# Patient Record
Sex: MALE | Race: WHITE | URBAN - METROPOLITAN AREA
[De-identification: names, ages, dates, MRNs, and addresses within clinical notes are randomized per-mention and may not be internally consistent; named-entity substitution may affect disease eponyms.]

---

## 2018-10-15 PROCEDURE — 86803 HEPATITIS C AB TEST: CPT | Performed by: INTERNAL MEDICINE

## 2018-10-19 PROCEDURE — 80074 ACUTE HEPATITIS PANEL: CPT | Performed by: INTERNAL MEDICINE

## 2018-10-19 PROCEDURE — 83516 IMMUNOASSAY NONANTIBODY: CPT | Performed by: INTERNAL MEDICINE

## 2018-10-19 PROCEDURE — 82390 ASSAY OF CERULOPLASMIN: CPT | Performed by: INTERNAL MEDICINE

## 2025-06-10 RX ORDER — ROSUVASTATIN CALCIUM 5 MG/1
5 TABLET, COATED ORAL NIGHTLY
COMMUNITY
Start: 2025-04-01

## 2025-06-10 RX ORDER — HEPARIN SODIUM 5000 [USP'U]/ML
5000 INJECTION, SOLUTION INTRAVENOUS; SUBCUTANEOUS ONCE
Status: CANCELLED | OUTPATIENT
Start: 2025-06-10 | End: 2025-06-10

## 2025-06-19 ENCOUNTER — ANESTHESIA EVENT (OUTPATIENT)
Dept: SURGERY | Facility: HOSPITAL | Age: 60
End: 2025-06-19
Payer: COMMERCIAL

## 2025-06-20 ENCOUNTER — APPOINTMENT (OUTPATIENT)
Dept: GENERAL RADIOLOGY | Facility: HOSPITAL | Age: 60
End: 2025-06-20
Attending: UROLOGY
Payer: COMMERCIAL

## 2025-06-20 ENCOUNTER — ANESTHESIA (OUTPATIENT)
Dept: SURGERY | Facility: HOSPITAL | Age: 60
End: 2025-06-20
Payer: COMMERCIAL

## 2025-06-20 ENCOUNTER — HOSPITAL ENCOUNTER (OUTPATIENT)
Facility: HOSPITAL | Age: 60
Discharge: HOME | End: 2025-06-21
Attending: UROLOGY | Admitting: UROLOGY
Payer: COMMERCIAL

## 2025-06-20 PROBLEM — N40.1 BPH LOC W URIN OBS/LUTS: Status: ACTIVE | Noted: 2025-06-20

## 2025-06-20 PROCEDURE — 88305 TISSUE EXAM BY PATHOLOGIST: CPT | Performed by: UROLOGY

## 2025-06-20 RX ORDER — NALOXONE HYDROCHLORIDE 0.4 MG/ML
0.08 INJECTION, SOLUTION INTRAMUSCULAR; INTRAVENOUS; SUBCUTANEOUS AS NEEDED
Status: DISCONTINUED | OUTPATIENT
Start: 2025-06-20 | End: 2025-06-20 | Stop reason: HOSPADM

## 2025-06-20 RX ORDER — ONDANSETRON 2 MG/ML
4 INJECTION INTRAMUSCULAR; INTRAVENOUS EVERY 6 HOURS PRN
Status: DISCONTINUED | OUTPATIENT
Start: 2025-06-20 | End: 2025-06-20 | Stop reason: HOSPADM

## 2025-06-20 RX ORDER — PROCHLORPERAZINE EDISYLATE 5 MG/ML
5 INJECTION INTRAMUSCULAR; INTRAVENOUS EVERY 8 HOURS PRN
Status: DISCONTINUED | OUTPATIENT
Start: 2025-06-20 | End: 2025-06-21

## 2025-06-20 RX ORDER — ALBUTEROL SULFATE 0.83 MG/ML
2.5 SOLUTION RESPIRATORY (INHALATION) AS NEEDED
Status: DISCONTINUED | OUTPATIENT
Start: 2025-06-20 | End: 2025-06-20 | Stop reason: HOSPADM

## 2025-06-20 RX ORDER — HYDROMORPHONE HYDROCHLORIDE 1 MG/ML
0.4 INJECTION, SOLUTION INTRAMUSCULAR; INTRAVENOUS; SUBCUTANEOUS EVERY 2 HOUR PRN
Status: DISCONTINUED | OUTPATIENT
Start: 2025-06-20 | End: 2025-06-21

## 2025-06-20 RX ORDER — ONDANSETRON 2 MG/ML
4 INJECTION INTRAMUSCULAR; INTRAVENOUS EVERY 6 HOURS PRN
Status: DISCONTINUED | OUTPATIENT
Start: 2025-06-20 | End: 2025-06-21

## 2025-06-20 RX ORDER — SODIUM CHLORIDE, SODIUM LACTATE, POTASSIUM CHLORIDE, CALCIUM CHLORIDE 600; 310; 30; 20 MG/100ML; MG/100ML; MG/100ML; MG/100ML
INJECTION, SOLUTION INTRAVENOUS CONTINUOUS
Status: DISCONTINUED | OUTPATIENT
Start: 2025-06-20 | End: 2025-06-20 | Stop reason: HOSPADM

## 2025-06-20 RX ORDER — SCOPOLAMINE 1 MG/3D
1 PATCH, EXTENDED RELEASE TRANSDERMAL ONCE
Status: DISCONTINUED | OUTPATIENT
Start: 2025-06-20 | End: 2025-06-20 | Stop reason: HOSPADM

## 2025-06-20 RX ORDER — ENOXAPARIN SODIUM 100 MG/ML
40 INJECTION SUBCUTANEOUS NIGHTLY
Status: DISCONTINUED | OUTPATIENT
Start: 2025-06-21 | End: 2025-06-21

## 2025-06-20 RX ORDER — PHENAZOPYRIDINE HYDROCHLORIDE 100 MG/1
200 TABLET, FILM COATED ORAL 3 TIMES DAILY PRN
Status: DISCONTINUED | OUTPATIENT
Start: 2025-06-20 | End: 2025-06-21

## 2025-06-20 RX ORDER — ROSUVASTATIN CALCIUM 5 MG/1
5 TABLET, COATED ORAL NIGHTLY
Status: DISCONTINUED | OUTPATIENT
Start: 2025-06-20 | End: 2025-06-21

## 2025-06-20 RX ORDER — ACETAMINOPHEN 500 MG
1000 TABLET ORAL ONCE
Status: DISCONTINUED | OUTPATIENT
Start: 2025-06-20 | End: 2025-06-20 | Stop reason: HOSPADM

## 2025-06-20 RX ORDER — FAMOTIDINE 20 MG/1
20 TABLET, FILM COATED ORAL 2 TIMES DAILY
Status: DISCONTINUED | OUTPATIENT
Start: 2025-06-20 | End: 2025-06-21

## 2025-06-20 RX ORDER — HYDROCODONE BITARTRATE AND ACETAMINOPHEN 5; 325 MG/1; MG/1
1 TABLET ORAL ONCE AS NEEDED
Status: DISCONTINUED | OUTPATIENT
Start: 2025-06-20 | End: 2025-06-20 | Stop reason: HOSPADM

## 2025-06-20 RX ORDER — FLUTICASONE PROPIONATE 50 MCG
2 SPRAY, SUSPENSION (ML) NASAL DAILY
Status: DISCONTINUED | OUTPATIENT
Start: 2025-06-20 | End: 2025-06-21

## 2025-06-20 RX ORDER — LABETALOL HYDROCHLORIDE 5 MG/ML
5 INJECTION, SOLUTION INTRAVENOUS EVERY 5 MIN PRN
Status: DISCONTINUED | OUTPATIENT
Start: 2025-06-20 | End: 2025-06-20 | Stop reason: HOSPADM

## 2025-06-20 RX ORDER — ONDANSETRON 2 MG/ML
INJECTION INTRAMUSCULAR; INTRAVENOUS AS NEEDED
Status: DISCONTINUED | OUTPATIENT
Start: 2025-06-20 | End: 2025-06-20 | Stop reason: SURG

## 2025-06-20 RX ORDER — SENNOSIDES 8.6 MG
17.2 TABLET ORAL NIGHTLY
Status: DISCONTINUED | OUTPATIENT
Start: 2025-06-20 | End: 2025-06-21

## 2025-06-20 RX ORDER — HYDROMORPHONE HYDROCHLORIDE 1 MG/ML
0.6 INJECTION, SOLUTION INTRAMUSCULAR; INTRAVENOUS; SUBCUTANEOUS EVERY 5 MIN PRN
Status: DISCONTINUED | OUTPATIENT
Start: 2025-06-20 | End: 2025-06-20 | Stop reason: HOSPADM

## 2025-06-20 RX ORDER — HYDROMORPHONE HYDROCHLORIDE 1 MG/ML
0.2 INJECTION, SOLUTION INTRAMUSCULAR; INTRAVENOUS; SUBCUTANEOUS EVERY 5 MIN PRN
Status: DISCONTINUED | OUTPATIENT
Start: 2025-06-20 | End: 2025-06-20 | Stop reason: HOSPADM

## 2025-06-20 RX ORDER — TAMSULOSIN HYDROCHLORIDE 0.4 MG/1
0.4 CAPSULE ORAL
COMMUNITY
Start: 2025-01-30

## 2025-06-20 RX ORDER — ACETAMINOPHEN 325 MG/1
650 TABLET ORAL
Status: DISCONTINUED | OUTPATIENT
Start: 2025-06-20 | End: 2025-06-21

## 2025-06-20 RX ORDER — FAMOTIDINE 10 MG/ML
20 INJECTION, SOLUTION INTRAVENOUS 2 TIMES DAILY
Status: DISCONTINUED | OUTPATIENT
Start: 2025-06-20 | End: 2025-06-21

## 2025-06-20 RX ORDER — ACETAMINOPHEN 500 MG
1000 TABLET ORAL ONCE AS NEEDED
Status: DISCONTINUED | OUTPATIENT
Start: 2025-06-20 | End: 2025-06-20 | Stop reason: HOSPADM

## 2025-06-20 RX ORDER — TAMSULOSIN HYDROCHLORIDE 0.4 MG/1
0.4 CAPSULE ORAL
Status: DISCONTINUED | OUTPATIENT
Start: 2025-06-20 | End: 2025-06-21

## 2025-06-20 RX ORDER — SODIUM CHLORIDE, SODIUM LACTATE, POTASSIUM CHLORIDE, CALCIUM CHLORIDE 600; 310; 30; 20 MG/100ML; MG/100ML; MG/100ML; MG/100ML
INJECTION, SOLUTION INTRAVENOUS CONTINUOUS
Status: DISCONTINUED | OUTPATIENT
Start: 2025-06-20 | End: 2025-06-20

## 2025-06-20 RX ORDER — SODIUM CHLORIDE 9 MG/ML
INJECTION, SOLUTION INTRAVENOUS CONTINUOUS
Status: DISCONTINUED | OUTPATIENT
Start: 2025-06-20 | End: 2025-06-21

## 2025-06-20 RX ORDER — HYDROMORPHONE HYDROCHLORIDE 1 MG/ML
0.8 INJECTION, SOLUTION INTRAMUSCULAR; INTRAVENOUS; SUBCUTANEOUS EVERY 2 HOUR PRN
Status: DISCONTINUED | OUTPATIENT
Start: 2025-06-20 | End: 2025-06-21

## 2025-06-20 RX ORDER — DEXAMETHASONE SODIUM PHOSPHATE 4 MG/ML
VIAL (ML) INJECTION AS NEEDED
Status: DISCONTINUED | OUTPATIENT
Start: 2025-06-20 | End: 2025-06-20 | Stop reason: SURG

## 2025-06-20 RX ORDER — MAGNESIUM HYDROXIDE 1200 MG/15ML
3000 LIQUID ORAL CONTINUOUS
Status: DISCONTINUED | OUTPATIENT
Start: 2025-06-20 | End: 2025-06-21

## 2025-06-20 RX ORDER — OXYCODONE HYDROCHLORIDE 5 MG/1
5 TABLET ORAL EVERY 4 HOURS PRN
Status: DISCONTINUED | OUTPATIENT
Start: 2025-06-20 | End: 2025-06-21

## 2025-06-20 RX ORDER — DOCUSATE SODIUM 100 MG/1
100 CAPSULE, LIQUID FILLED ORAL 2 TIMES DAILY
Status: DISCONTINUED | OUTPATIENT
Start: 2025-06-20 | End: 2025-06-21

## 2025-06-20 RX ORDER — HYDROCODONE BITARTRATE AND ACETAMINOPHEN 5; 325 MG/1; MG/1
2 TABLET ORAL ONCE AS NEEDED
Status: DISCONTINUED | OUTPATIENT
Start: 2025-06-20 | End: 2025-06-20 | Stop reason: HOSPADM

## 2025-06-20 RX ORDER — HYDROMORPHONE HYDROCHLORIDE 1 MG/ML
0.4 INJECTION, SOLUTION INTRAMUSCULAR; INTRAVENOUS; SUBCUTANEOUS EVERY 5 MIN PRN
Status: DISCONTINUED | OUTPATIENT
Start: 2025-06-20 | End: 2025-06-20 | Stop reason: HOSPADM

## 2025-06-20 RX ORDER — OXYCODONE HYDROCHLORIDE 10 MG/1
10 TABLET ORAL EVERY 4 HOURS PRN
Status: DISCONTINUED | OUTPATIENT
Start: 2025-06-20 | End: 2025-06-21

## 2025-06-20 RX ORDER — CODEINE PHOSPHATE AND GUAIFENESIN 10; 100 MG/5ML; MG/5ML
10 SOLUTION ORAL NIGHTLY PRN
Status: DISCONTINUED | OUTPATIENT
Start: 2025-06-20 | End: 2025-06-21

## 2025-06-20 RX ADMIN — ONDANSETRON 4 MG: 2 INJECTION INTRAMUSCULAR; INTRAVENOUS at 16:20:00

## 2025-06-20 RX ADMIN — DEXAMETHASONE SODIUM PHOSPHATE 4 MG: 4 MG/ML VIAL (ML) INJECTION at 15:41:00

## 2025-06-20 NOTE — ANESTHESIA PROCEDURE NOTES
Airway  Date/Time: 6/20/2025 3:23 PM  Reason: elective      General Information and Staff   Patient location during procedure: OR  Anesthesiologist: Casper Grant MD  Performed: anesthesiologist   Performed by: Casper Grant MD  Authorized by: Csaper Grant MD        Indications and Patient Condition  Indications for airway management: anesthesia  Sedation level: deep      Preoxygenated: yesPatient position: sniffing    Mask difficulty assessment: 1 - vent by mask    Final Airway Details    Final airway type: supraglottic airway      Successful airway: classic  Size: 4     Number of attempts at approach: 1

## 2025-06-20 NOTE — ANESTHESIA PREPROCEDURE EVALUATION
PRE-OP EVALUATION    Patient Name: Donald Tolliver    Admit Diagnosis: BENIGN PROSTATIC HYPERPLASIA LOWER URINARY TRACT SYMPTOMS    Pre-op Diagnosis: BENIGN PROSTATIC HYPERPLASIA LOWER URINARY TRACT SYMPTOMS    CYSTOSCOPY, TRANSURETHRAL RESECTION OF PROSTATE    Anesthesia Procedure: CYSTOSCOPY, TRANSURETHRAL RESECTION OF PROSTATE    Surgeons and Role:     * Nayana Ervin MD - Primary    Pre-op vitals reviewed.  Temp: 98.1 °F (36.7 °C)  Pulse: 67  Resp: 16  BP: 129/88  SpO2: 100 %  Body mass index is 25.52 kg/m².    Current medications reviewed.  Hospital Medications:  Current Medications[1]    Outpatient Medications:   Prescriptions Prior to Admission[2]    Allergies: Simvastatin      Anesthesia Evaluation        Anesthetic Complications           GI/Hepatic/Renal    Negative GI/hepatic/renal ROS.                             Cardiovascular        Exercise tolerance: good     MET: >4         (+) hyperlipidemia                                  Endo/Other    Negative endo/other ROS.                              Pulmonary    Negative pulmonary ROS.                       Neuro/Psych                 (+) neuromuscular disease                     Past Surgical History[3]  Social Hx on file[4]  History   Drug Use No     Available pre-op labs reviewed.               Airway      Mallampati: II  Mouth opening: 3 FB  TM distance: 4 - 6 cm  Neck ROM: full Cardiovascular      Rhythm: regular  Rate: normal     Dental    Dentition appears grossly intact         Pulmonary      Breath sounds clear to auscultation bilaterally.               Other findings              ASA: 2   Plan: general  NPO status verified and patient meets guidelines.    Post-procedure pain management plan discussed with surgeon and patient.    Comment: Options, risks and benefits of anesthesia as outlined in the anesthesia consent were reviewed with the patient. Risks and benefits of GA including sore throat, allergy, nausea, vomiting, dental trauma, pain  management modalities were all discussed. Particularly the risk of dental trauma with weakened teeth or crowns, partials, fillings and any non natural teeth due to instrumentation of oral cavity and airway. Patient understands risks and verbally agreed to proceed. All questions answered.The consent was signed without further questions.        Plan/risks discussed with: patient  Use of blood product(s) discussed with: patient    Consented to blood products.          Present on Admission:  **None**             [1]    [Transfer Hold] acetaminophen (Tylenol Extra Strength) tab 1,000 mg  1,000 mg Oral Once    [Transfer Hold] scopolamine (Transderm-Scop) 1 MG/3DAYS patch 1 patch  1 patch Transdermal Once    lactated ringers infusion   Intravenous Continuous    ceFAZolin (Ancef) 2g in 10mL IV syringe premix  2 g Intravenous Once   [2]   Medications Prior to Admission   Medication Sig Dispense Refill Last Dose/Taking    tamsulosin 0.4 MG Oral Cap Take 1 capsule (0.4 mg total) by mouth daily with dinner.   6/13/2025    rosuvastatin 5 MG Oral Tab Take 1 tablet (5 mg total) by mouth nightly.   6/13/2025    cyclobenzaprine 10 MG Oral Tab TAKE 1 TABLET BY MOUTH THREE TIMES DAILY AS NEEDED FOR  MUSCLE  SPASMS. 90 tablet 3 6/13/2025    naproxen 500 MG Oral Tab TAKE 1 TABLET BY MOUTH TWICE DAILY WITH MEALS 60 tablet 2 6/11/2025    Fluticasone Propionate 50 MCG/ACT Nasal Suspension 2 sprays by Each Nare route daily. 3 Bottle 3 More than a month    Amoxicillin-Pot Clavulanate (AUGMENTIN) 875-125 MG Oral Tab Take 1 tablet by mouth 2 times daily after a meal. (Patient not taking: Reported on 6/10/2025) 20 tablet 0 Not Taking    guaiFENesin-codeine (CHERATUSSIN AC) 100-10 MG/5ML Oral Solution Take 10 mL by mouth nightly as needed for cough. 180 mL 0 More than a month   [3]   Past Surgical History:  Procedure Laterality Date    Colonoscopy  5/10/14 -GORGE Perez    hemorrhoids, repeat 2024.    Colonoscopy,diagnostic N/A 5/10/2014    Procedure:  COLONOSCOPY, POSSIBLE BIOPSY, POSSIBLE POLYPECTOMY 18696;  Surgeon: Agustín Perez MD;  Location: [facility], [facility]    Other surgical history      wisdom teeth extracted    Other surgical history      finger surgery   [4]   Social History  Socioeconomic History    Marital status:     Number of children: 1   Occupational History    Occupation: Director   Tobacco Use    Smoking status: Never    Smokeless tobacco: Never   Vaping Use    Vaping status: Never Used   Substance and Sexual Activity    Alcohol use: Not Currently    Drug use: No    Sexual activity: Yes     Partners: Female   Other Topics Concern     Service No    Blood Transfusions No    Sleep Concern No    Exercise Yes     Comment: cardio sporadic    Seat Belt Yes

## 2025-06-20 NOTE — PROGRESS NOTES
NURSING ADMISSION NOTE      Patient admitted via bed  Oriented to room.  Safety precautions initiated.  Bed in low position.  Call light in reach.    2 person skin check completed with Mehnaz PCT. Skin intact, no lesions or skin breakdown noted.

## 2025-06-20 NOTE — DISCHARGE INSTRUCTIONS
Home Care Instructions  CYSTOSCOPY/TRANSURETHRAL RESECTION PROSTATE  (TURP)     Your urine will probably be cranberry colored on and off for a couple of weeks. If your urine becomes thick and red, or if you have a large amount of clots when you urinate, call the doctor. Also call the doctor if you are unable to urinate.     Do not strain to have a bowel movement as this can cause bleeding (keep stools soft). I suggest the use of an Over-the-Counter stool softener.     Drink 6-10 glasses of water/juice each day. This will help flush the bladder out. You will want to slow down your fluid intake just prior to bedtime so you are not up all night urinating.     If you take aspirin or Ibuprofen (Advil, Nuprin, Motrin) you must have your doctor’s approval before taking them again. Check with your doctor before starting any other medications.     You may shower.     You may eat your usual diet.     No strenuous activity or lifting more than 15 pounds for 4 weeks.      6/20/2025      [facility]  [phone]

## 2025-06-20 NOTE — H&P
Pre-op Diagnosis: BENIGN PROSTATIC HYPERPLASIA LOWER URINARY TRACT SYMPTOMS    The above referenced H&P by Dr More 6/6/25 was reviewed by Nayana Ervin MD on 6/20/2025, the patient was examined and no significant changes have occurred in the patient's condition since the H&P was performed.  I discussed with the patient and/or legal representative the potential benefits, risks and side effects of this procedure; the likelihood of the patient achieving goals; and potential problems that might occur during recuperation.  I discussed reasonable alternatives to the procedure, including risks, benefits and side effects related to the alternatives and risks related to not receiving this procedure.  We will proceed with procedure as planned.    BONG Ervin MD   6/20/25

## 2025-06-20 NOTE — OPERATIVE REPORT
[facility]   part of [facility] Patient Status:  Outpatient in a Bed    1965 MRN [id number]   Location [facility] POST ANESTHESIA CARE UNIT Attending Nayana Ervin MD   Hosp Day # 0 PCP Kiko More MD     Date of Operation; 2025    Procedure: Cystoscopy, Transurethral Resection of Prostate    Pre-Op Diagnosis:  BPH with LUTS    Post-Op Diagnosis: BPH with LUTS    Surgeon: Nayana Ervin M.D.    Anesthesia:  General     Specimens: Prostate tissue    Blood Loss: 20 ml    Complications:  None    Drains:  22 Fr 3-way Neff, CBI    Findings: Anterior Urethra was normal.  The prostate was  trilobar in appearance with intravesical prostate lobe obstruction. The ureteral orifices were in the normal position. The bladder showed moderate trabeculations, no diverticula. After resesction, the underlying tissue was sent to pathology and the prostatic fossa was wide open.    Indications:  The patient is a 60 yea old  male who has a know history of BPH with LUTS, previously failed medical treatment on alpha-1 inhibitors.  He wishes to proceed with surgical intervention as his symptoms significantly impair his quality of life.  All risks, benefits, and potential adverse events were reviewed, as well as the personal involved in the surgery, and a consent was signed.     Technique:  The patient was brought to the operating room and placed in a supine position. A general anesthesia was induced.  Preoperative antibiotics were administered.  The patient was prepped and draped in the dorsal lithotomy position.  Sequential compression devices were placed on the lower extremities.  A time-out was reviewed.     A cystoscope was passed through the urethra under direct vision and the urethra and bladder were examined, with the findings as described above.  A 26-[place]er resectoscope sheath was passed into the urethra, and then a bipolar loop was used to resect  the prostate. Prior to proceeding with resection, both ureter orrifeces and the Veru montanum (external sphincter) were clearly identified and careful attention was paid not to injury these structure. The prostate was resected in a systematic manner, starting with the median lobe, followed by the left and right lateral lobes.  The prostate specimen was irrigated out of the bladder with an Elick syringe.The base of the resection was coagulated and hemostasis was obtained. The irrigation was returning with only the slightest pink tinge. A 3-way 22 Fr arndt catheter was placed in the bladder and CBI was started at low rate.  There were no complications and the patient tolerated the procedure well.    The patient was awakened from anesthesia and transferred to PACU in stable condition.  He will be admitted to regular floor over night.      Nayana Ervin MD  [facility]  6/20/2025

## 2025-06-20 NOTE — ANESTHESIA POSTPROCEDURE EVALUATION
[facility]    Donald Tolliver Patient Status:  Outpatient in a Bed   Age/Gender 60 year old male MRN [id number]   Location [facility] SURGERY Attending Nayana Ervin MD   Hosp Day # 0 PCP Kiko More MD       Anesthesia Post-op Note    CYSTOSCOPY, TRANSURETHRAL RESECTION OF PROSTATE    Procedure Summary       Date: 06/20/25 Room / Location:  MAIN OR 07 /  MAIN OR    Anesthesia Start: 1515 Anesthesia Stop: 1633    Procedure: CYSTOSCOPY, TRANSURETHRAL RESECTION OF PROSTATE Diagnosis: (BENIGN PROSTATIC HYPERPLASIA LOWER URINARY TRACT SYMPTOMS)    Surgeons: Nayana Ervin MD Anesthesiologist: Casper Grant MD    Anesthesia Type: general ASA Status: 2            Anesthesia Type: general    Vitals Value Taken Time   /75 06/20/25 16:33   Temp 98.2 06/20/25 16:33   Pulse 90 06/20/25 16:33   Resp 20 06/20/25 16:33   SpO2 95 % 06/20/25 16:33   Vitals shown include unfiled device data.        Patient Location: PACU    Anesthesia Type: general    Airway Patency: patent and extubated    Postop Pain Control: adequate    Nausea/Vomiting: none    Cardiopulmonary/Hydration status: stable euvolemic    Complications: no apparent anesthesia related complications    Postop vital signs: stable    Dental Exam: Unchanged from Preop    Patient to be discharged from PACU when criteria met.

## 2025-06-21 VITALS
WEIGHT: 188.19 LBS | TEMPERATURE: 98 F | HEART RATE: 78 BPM | SYSTOLIC BLOOD PRESSURE: 128 MMHG | HEIGHT: 72 IN | OXYGEN SATURATION: 97 % | DIASTOLIC BLOOD PRESSURE: 63 MMHG | BODY MASS INDEX: 25.49 KG/M2 | RESPIRATION RATE: 16 BRPM

## 2025-06-21 LAB
ALBUMIN SERPL-MCNC: 4.2 G/DL (ref 3.2–4.8)
ANION GAP SERPL CALC-SCNC: 8 MMOL/L (ref 0–18)
ANION GAP SERPL CALC-SCNC: 8 MMOL/L (ref 0–18)
BASOPHILS # BLD AUTO: 0.01 X10(3) UL (ref 0–0.2)
BASOPHILS NFR BLD AUTO: 0.1 %
BUN BLD-MCNC: 12 MG/DL (ref 9–23)
BUN BLD-MCNC: 12 MG/DL (ref 9–23)
CALCIUM BLD-MCNC: 9 MG/DL (ref 8.7–10.6)
CALCIUM BLD-MCNC: 9 MG/DL (ref 8.7–10.6)
CHLORIDE SERPL-SCNC: 104 MMOL/L (ref 98–112)
CHLORIDE SERPL-SCNC: 104 MMOL/L (ref 98–112)
CO2 SERPL-SCNC: 27 MMOL/L (ref 21–32)
CO2 SERPL-SCNC: 27 MMOL/L (ref 21–32)
CREAT BLD-MCNC: 0.98 MG/DL (ref 0.7–1.3)
CREAT BLD-MCNC: 0.98 MG/DL (ref 0.7–1.3)
EGFRCR SERPLBLD CKD-EPI 2021: 88 ML/MIN/1.73M2 (ref 60–?)
EGFRCR SERPLBLD CKD-EPI 2021: 88 ML/MIN/1.73M2 (ref 60–?)
EOSINOPHIL # BLD AUTO: 0 X10(3) UL (ref 0–0.7)
EOSINOPHIL NFR BLD AUTO: 0 %
ERYTHROCYTE [DISTWIDTH] IN BLOOD BY AUTOMATED COUNT: 15 %
GLUCOSE BLD-MCNC: 125 MG/DL (ref 70–99)
GLUCOSE BLD-MCNC: 125 MG/DL (ref 70–99)
HCT VFR BLD AUTO: 42.4 % (ref 39–53)
HGB BLD-MCNC: 13.7 G/DL (ref 13–17.5)
IMM GRANULOCYTES # BLD AUTO: 0.05 X10(3) UL (ref 0–1)
IMM GRANULOCYTES NFR BLD: 0.6 %
LYMPHOCYTES # BLD AUTO: 1.03 X10(3) UL (ref 1–4)
LYMPHOCYTES NFR BLD AUTO: 12.7 %
MAGNESIUM SERPL-MCNC: 2 MG/DL (ref 1.6–2.6)
MCH RBC QN AUTO: 27.6 PG (ref 26–34)
MCHC RBC AUTO-ENTMCNC: 32.3 G/DL (ref 31–37)
MCV RBC AUTO: 85.5 FL (ref 80–100)
MONOCYTES # BLD AUTO: 0.43 X10(3) UL (ref 0.1–1)
MONOCYTES NFR BLD AUTO: 5.3 %
NEUTROPHILS # BLD AUTO: 6.62 X10 (3) UL (ref 1.5–7.7)
NEUTROPHILS # BLD AUTO: 6.62 X10(3) UL (ref 1.5–7.7)
NEUTROPHILS NFR BLD AUTO: 81.3 %
OSMOLALITY SERPL CALC.SUM OF ELEC: 289 MOSM/KG (ref 275–295)
OSMOLALITY SERPL CALC.SUM OF ELEC: 289 MOSM/KG (ref 275–295)
PHOSPHATE SERPL-MCNC: 3.6 MG/DL (ref 2.4–5.1)
PLATELET # BLD AUTO: 269 10(3)UL (ref 150–450)
POTASSIUM SERPL-SCNC: 5 MMOL/L (ref 3.5–5.1)
POTASSIUM SERPL-SCNC: 5 MMOL/L (ref 3.5–5.1)
RBC # BLD AUTO: 4.96 X10(6)UL (ref 4.3–5.7)
SODIUM SERPL-SCNC: 139 MMOL/L (ref 136–145)
SODIUM SERPL-SCNC: 139 MMOL/L (ref 136–145)
WBC # BLD AUTO: 8.1 X10(3) UL (ref 4–11)

## 2025-06-21 PROCEDURE — 80069 RENAL FUNCTION PANEL: CPT | Performed by: UROLOGY

## 2025-06-21 PROCEDURE — 83735 ASSAY OF MAGNESIUM: CPT

## 2025-06-21 PROCEDURE — 85025 COMPLETE CBC W/AUTO DIFF WBC: CPT | Performed by: UROLOGY

## 2025-06-21 PROCEDURE — 80048 BASIC METABOLIC PNL TOTAL CA: CPT | Performed by: UROLOGY

## 2025-06-21 NOTE — PLAN OF CARE
Problem: Patient/Family Goals  Goal: Patient/Family Long Term Goal  Description: Patient's Long Term Goal: Go home  IVF   Ambulate as tolerated   Prn pain meds  Void trial  Interventions:  - See additional Care Plan goals for specific interventions  Outcome: Progressing  Goal: Patient/Family Short Term Goal  Description: Patient's Short Term Goal: tolerate pain  Prn pain meds    Interventions:   - See additional Care Plan goals for specific interventions  Outcome: Progressing

## 2025-06-21 NOTE — PROGRESS NOTES
A&Ox4. VSS. RA. . Encouraged IS.  Abdomen soft, nondistended. Denies passing gas or nausea at this time.  CBI running slow. Pink and clear in tube.  Denies pain.   Up with standby assist.  Diet: Regular  IVF running per order.  Plan of care discussed with patient and call light in reach.

## 2025-06-21 NOTE — PROGRESS NOTES
NURSING DISCHARGE NOTE    Discharged Home via Wheelchair.  Accompanied by Spouse  Belongings Taken by patient/family.    Patient given discharge instructions. Instructed to follow-up with urology on scheduled appointment date. Patient verbalized understanding. IV removed and intact. All questions and concerns addressed at this time.

## 2025-06-21 NOTE — CONSULTS
General Medicine Consult      Reason for consult:    Consulted by: Nayana Ervin MD    PCP: Kiko More MD      History of Present Illness: Patient is a 60 year old male with a past medical history of hyperlipidemia who is presenting to the hospital for cystoscopy, TURP procedure.  Tolerated procedure well on 6 #20.      PMH:  Past Medical History[1]     PSH:  Past Surgical History[2]     Home Medications:  Medications Taking[3]    Scheduled Medication:  Scheduled Medications[4]  Continuous Infusing Medication:  Medication Infusions[5]  PRN Medication:PRN Medications[6]     ALL:  Allergies[7]     Soc Hx:  Social History     Tobacco Use    Smoking status: Never    Smokeless tobacco: Never   Substance Use Topics    Alcohol use: Not Currently        Fam Hx  Family History[8]    Review of Systems  Comprehensive ROS reviewed and negative except for what's stated above.  Including negative for chest pain, shortness of breath, syncope.       OBJECTIVE:  /75 (BP Location: Left arm)   Pulse 73   Temp 97.6 °F (36.4 °C) (Oral)   Resp 16   Ht 6' (1.829 m)   Wt 188 lb 3.2 oz (85.4 kg)   SpO2 99%   BMI 25.52 kg/m²     Exam   Gen: Alert, no acute distress  Heent: Normocephalic, atraumatic, neck supple, EOMI, PERRLA  Pulm: Lungs CTAB, normal respiratory effort  CV:  Regular rate and rhythm, no murmurs/rubs/gallops  Abd: Soft, nontender, nondistended, bowel sounds present  Extremities: No peripheral edema, no clubbing, pulses intact   Skin: No rashes or lesions  Neuro: AOx3, no focal neurologic deficits, CN II-XII grossly intact  Psych: appropriate mood and affect     Diagnostics:   CBC/Chem  No results for input(s): \"WBC\", \"HGB\", \"MCV\", \"PLT\", \"BAND\", \"INR\" in the last 168 hours.    Invalid input(s): \"LYM#\", \"MONO#\", \"BASOS#\", \"EOSIN#\"    No results for input(s): \"NA\", \"K\", \"CL\", \"CO2\", \"BUN\", \"CREATSERUM\", \"GLU\", \"CA\", \"CAION\", \"MG\", \"PHOS\" in the last 168 hours.    No results for input(s): \"ALT\", \"AST\",  \"ALB\", \"AMYLASE\", \"LIPASE\", \"LDH\" in the last 168 hours.    Invalid input(s): \"ALPHOS\", \"TBIL\", \"DBIL\", \"TPROT\"    Radiology:   All imaging personally reviewed      No results found.       ASSESSMENT / PLAN:    60 year old male with a past medical history of hyperlipidemia who is presenting to the hospital for cystoscopy, TURP procedure.    BPH with LUTS s/p cystoscopy, TURP (6/20)  - PO/IV meds for pain control per urology, wean to oral meds as able  - Adv diet as tolerated per urology, zofran prn for nausea, OBR  - PT/OT/SW  - monitor for acute blood loss anemia, cbc in am  - further management per urology  - Tamsulosin    Allergic rhinitis  - Continue home Flonase    Hyperlipidemia  - Continue home statin    Dispo: Inpatient    Outpatient or previous hospital records reviewed. Questions/concerns were discussed with patient and/or family by bedside. Discussed with urology.     Jeri Vivar DO  [place] [facility]  Contact via [employer]      Supplementary Documentation:   DVT Mechanical Prophylaxis:   SCDs, Early ambuation  DVT Pharmacologic Prophylaxis   Medication    [START ON 6/21/2025] enoxaparin (Lovenox) 40 MG/0.4ML SUBQ injection 40 mg                Code Status: Not on file  Neff: Neff catheter in place  Neff Duration (in days): 1  Central line:    RANDY:              [1]   Past Medical History:   High cholesterol    HYPERLIPIDEMIA   [2]   Past Surgical History:  Procedure Laterality Date    Colonoscopy  5/10/14 -GORGE Perez    hemorrhoids, repeat 2024.    Colonoscopy,diagnostic N/A 5/10/2014    Procedure: COLONOSCOPY, POSSIBLE BIOPSY, POSSIBLE POLYPECTOMY 80467;  Surgeon: Agustín Perez MD;  Location: [facility] [place]    Other surgical history      wisdom teeth extracted    Other surgical history      finger surgery   [3]   Outpatient Medications Marked as Taking for the 6/20/25 encounter (Hospital Encounter)   Medication Sig Dispense Refill    tamsulosin 0.4 MG Oral Cap  Take 1 capsule (0.4 mg total) by mouth daily with dinner.      rosuvastatin 5 MG Oral Tab Take 1 tablet (5 mg total) by mouth nightly.      cyclobenzaprine 10 MG Oral Tab TAKE 1 TABLET BY MOUTH THREE TIMES DAILY AS NEEDED FOR  MUSCLE  SPASMS. 90 tablet 3    naproxen 500 MG Oral Tab TAKE 1 TABLET BY MOUTH TWICE DAILY WITH MEALS 60 tablet 2   [4]    fluticasone propionate  2 spray Each Nare Daily    rosuvastatin  5 mg Oral Nightly    tamsulosin  0.4 mg Oral Daily with dinner    [START ON 6/21/2025] enoxaparin  40 mg Subcutaneous Nightly    sennosides  17.2 mg Oral Nightly    docusate sodium  100 mg Oral BID    acetaminophen  650 mg Oral Q4H While awake    famotidine  20 mg Oral BID    Or    famotidine  20 mg Intravenous BID    ceFAZolin  2 g Intravenous Q8H   [5]    sodium chloride 83 mL/hr at 06/20/25 1726    sodium chloride     [6]   guaiFENesin-codeine    ondansetron    prochlorperazine    phenazopyridine    oxyCODONE **OR** oxyCODONE    HYDROmorphone **OR** HYDROmorphone  [7]   Allergies  Allergen Reactions    Simvastatin UNKNOWN     Incr lft   [8]   Family History  Problem Relation Age of Onset    Hypertension Mother     Psychiatric Mother         DEPRESSION

## 2025-06-21 NOTE — DISCHARGE SUMMARY
Hospitalist Discharge Summary    Admission Date/Time  6/20/2025 12:19 PM  Discharge Date  06/21/25    PCP  Kiko More MD     Discharging Hospitalist:  Jeri Vivar DO    Disposition:  Home    Follow Up Appointments  Nayana Ervin MD  [address] [place] [address]  [address]  [facility] [id number]  [phone]    Follow up on 7/14/2025  post-op visit      Primary Hospital Problems/Hospital Course Summary  60 year old male with a past medical history of hyperlipidemia and BPH with LUTS who is presenting to the hospital for cystoscopy and TURP procedure on 6/20.  Tolerated procedure well, monitored overnight with Neff catheter and CBI.  Neff catheter removed, patient able to void on his own the next morning.  Stable for discharge at this time, will follow-up with urology on discharge.    Medication Changes  N/a    Procedures/Diagnostics  cystoscopy and TURP    Change in Code Status: n/a    Discharge Medications       Medication List        CONTINUE taking these medications      amoxicillin clavulanate 875-125 MG Tabs  Commonly known as: Augmentin  Take 1 tablet by mouth 2 times daily after a meal.     cyclobenzaprine 10 MG Tabs  Commonly known as: Flexeril  TAKE 1 TABLET BY MOUTH THREE TIMES DAILY AS NEEDED FOR  MUSCLE  SPASMS.     fluticasone propionate 50 MCG/ACT Susp  Commonly known as: Flonase  2 sprays by Each Nare route daily.     guaiFENesin-codeine 100-10 MG/5ML Soln  Commonly known as: Cheratussin AC  Take 10 mL by mouth nightly as needed for cough.     naproxen 500 MG Tabs  Commonly known as: Naprosyn  TAKE 1 TABLET BY MOUTH TWICE DAILY WITH MEALS     rosuvastatin 5 MG Tabs  Commonly known as: Crestor     tamsulosin 0.4 MG Caps  Commonly known as: Flomax            I reconciled current and discharge medications on the day of discharge.    Imaging/Diagnostic Reports  No results found.    Pertinent Physical Exam At Time of Discharge  Vitals:    06/21/25 0415 06/21/25 0731 06/21/25 0900 06/21/25 1045   BP: 125/62  128/63     BP Location: Left arm Left arm     Pulse: 60 67 73 78   Resp: 15 16     Temp: 97.9 °F (36.6 °C) 97.6 °F (36.4 °C)     TempSrc: Oral Oral     SpO2: 99% 98% 99% 97%   Weight:       Height:            General: no acute distress  Heart: RRR  Lungs: CTAB  Abd: Soft, NT, ND  Neuro: no focal deficits    Total time coordinating care 36 mins.    Patient had opportunity to ask questions and stated understanding and agreed with therapeutic plan as outlined.     Jeri Vivar DO  6/21/2025

## 2025-06-21 NOTE — PROGRESS NOTES
[facility]   part of [facility]    Progress Note    Donald Tolliver Patient Status:  Outpatient in a Bed    1965 MRN [id number]   [facility] 3NW-A Attending Nayana Ervin MD   Hosp Day # 0 PCP Kiko More MD     Subjective:   Donald Tolliver is a(n) 60 year old male POD 1 s/p TURP with Dr. Ervin.    Doing well this AM. No complaints.    Urine is clear off of CBI.     Pain controlled.    Objective:   Blood pressure 128/63, pulse 67, temperature 97.6 °F (36.4 °C), temperature source Oral, resp. rate 16, height 72\", weight 188 lb 3.2 oz, SpO2 98%.    General appearance: alert, appears stated age, and cooperative  Head: Normocephalic, without obvious abnormality, atraumatic  Pulmonary:  Non labored respirations  Male genitalia: 3 way arndt in place. Clear urine off of CBI.  Neurologic: Grossly normal  Psychiatric: calm    Results:   Lab Results   Component Value Date    WBC 8.1 2025    HGB 13.7 2025    HCT 42.4 2025    .0 2025    CREATSERUM 0.98 2025    CREATSERUM 0.98 2025    BUN 12 2025    BUN 12 2025     2025     2025    K 5.0 2025    K 5.0 2025     2025     2025    CO2 27.0 2025    CO2 27.0 2025     (H) 2025     (H) 2025    CA 9.0 2025    CA 9.0 2025    ALB 4.2 2025    ALKPHO 70 2020    BILT 0.45 2020    TP 7.0 2020    AST 25 2020    ALT 50 2020    TSH 1.221 2020    PSA 1.20 2020    MG 2.0 2025    PHOS 3.6 2025     Assessment & Plan:     BPH loc w urin obs/LUTS    POD 1 s/p TURP with Dr. Ervin.  Doing well with clear urine off of CBI this AM.    OK to remove arndt this AM.  Void trial. Check PVR after voiding and notify me.    He will be able to be discharged to home later today.     Follow up with Dr. Ervin as scheduled in mid  July.    Dick Parikh MD  6/21/2025

## (undated) DEVICE — SOLUTION IRRIG 3000ML 0.9% NACL FLX CONT

## (undated) DEVICE — Device

## (undated) DEVICE — HF-RESECTION ELECTRODE PLASMALOOP LOOP, LARGE, 24 FR., 12°/16°, ESG TURIS: Brand: OLYMPUS

## (undated) DEVICE — PACK PBDS CYSTOSCOPY

## (undated) DEVICE — GLOVE SUR 7.5 SENSICARE PI PIP CRM PWD F

## (undated) DEVICE — SLEEVE COMPR MD KNEE LEN SGL USE KENDALL SCD

## (undated) DEVICE — SYRINGE,TOOMEY,IRRIGATION,70CC,STERILE: Brand: MEDLINE